# Patient Record
Sex: FEMALE | URBAN - METROPOLITAN AREA
[De-identification: names, ages, dates, MRNs, and addresses within clinical notes are randomized per-mention and may not be internally consistent; named-entity substitution may affect disease eponyms.]

---

## 2022-12-02 ENCOUNTER — OFFICE VISIT (OUTPATIENT)
Dept: URBAN - METROPOLITAN AREA CLINIC 19 | Facility: CLINIC | Age: 77
End: 2022-12-02
Payer: MEDICARE

## 2022-12-02 ENCOUNTER — DASHBOARD ENCOUNTERS (OUTPATIENT)
Age: 77
End: 2022-12-02

## 2022-12-02 VITALS
BODY MASS INDEX: 31.51 KG/M2 | DIASTOLIC BLOOD PRESSURE: 64 MMHG | WEIGHT: 184.6 LBS | HEART RATE: 67 BPM | SYSTOLIC BLOOD PRESSURE: 106 MMHG | HEIGHT: 64 IN

## 2022-12-02 DIAGNOSIS — K92.1 BLOOD IN STOOL: ICD-10-CM

## 2022-12-02 DIAGNOSIS — R15.9 FECAL INCONTINENCE: ICD-10-CM

## 2022-12-02 DIAGNOSIS — R19.8 TENESMUS: ICD-10-CM

## 2022-12-02 DIAGNOSIS — R19.7 DIARRHEA: ICD-10-CM

## 2022-12-02 PROBLEM — 267053000 TENESMUS: Status: ACTIVE | Noted: 2022-12-02

## 2022-12-02 PROBLEM — 301754002 RIGHT LOWER QUADRANT PAIN: Status: ACTIVE | Noted: 2022-12-02

## 2022-12-02 PROBLEM — 128926000 POSTPROCEDURAL STATES: Status: ACTIVE | Noted: 2022-12-02

## 2022-12-02 PROCEDURE — 99244 OFF/OP CNSLTJ NEW/EST MOD 40: CPT | Performed by: INTERNAL MEDICINE

## 2022-12-02 PROCEDURE — 99204 OFFICE O/P NEW MOD 45 MIN: CPT | Performed by: INTERNAL MEDICINE

## 2022-12-02 RX ORDER — ATENOLOL 25 MG/1
1 TABLET TABLET ORAL ONCE A DAY
Status: ACTIVE | COMMUNITY

## 2022-12-02 NOTE — HPI-TODAY'S VISIT:
Patient presents as a referral from Dr. Loni Valdes for evaluation of a variety of gastrointestinal complaints. A copy of this note will be sent to the referring provider.   The patient had been getting regular colonoscopies by Dr. Anam Lange - will get previous reports for review. She states that she is concerned that her father, a WW[employer] , may have had colon cancer, but he apparently would never see the doctor. About 3 weeks ago, she had some right-sided abdominal pain associated with liquid yellow stool and a small amount of blood. She has issues with tenesmus and fecal incontinence - she had a hemorrhoidectomy in 1974. We discussed the possibility of IBS - she does eat Greek yogurt/smoothies every day but has never tried an antispasmotic.

## 2022-12-05 PROBLEM — 62315008 DIARRHEA: Status: ACTIVE | Noted: 2022-12-02

## 2022-12-05 LAB
A/G RATIO: 1.7
ALBUMIN: 4.3
ALKALINE PHOSPHATASE: 65
ALT (SGPT): 11
AST (SGOT): 16
BILIRUBIN, TOTAL: 0.6
BUN/CREATININE RATIO: (no result)
BUN: 13
C-REACTIVE PROTEIN, QUANT: 11.6
CALCIUM: 8.9
CARBON DIOXIDE, TOTAL: 29
CHLORIDE: 105
CREATININE: 0.91
EGFR: 66
GLOBULIN, TOTAL: 2.6
GLUCOSE: 82
HEMATOCRIT: 39.9
HEMOGLOBIN: 13.8
MCH: 31.2
MCHC: 34.6
MCV: 90.3
MPV: 12.7
PLATELET COUNT: 182
POTASSIUM: 4.6
PROTEIN, TOTAL: 6.9
RDW: 12.4
RED BLOOD CELL COUNT: 4.42
SED RATE BY MODIFIED: 22
SODIUM: 141
WHITE BLOOD CELL COUNT: 4.4

## 2022-12-06 ENCOUNTER — TELEPHONE ENCOUNTER (OUTPATIENT)
Dept: URBAN - METROPOLITAN AREA CLINIC 96 | Facility: CLINIC | Age: 77
End: 2022-12-06

## 2022-12-13 ENCOUNTER — CLAIMS CREATED FROM THE CLAIM WINDOW (OUTPATIENT)
Dept: URBAN - METROPOLITAN AREA SURGERY CENTER 31 | Facility: SURGERY CENTER | Age: 77
End: 2022-12-13

## 2022-12-13 ENCOUNTER — CLAIMS CREATED FROM THE CLAIM WINDOW (OUTPATIENT)
Dept: URBAN - METROPOLITAN AREA SURGERY CENTER 31 | Facility: SURGERY CENTER | Age: 77
End: 2022-12-13
Payer: MEDICARE

## 2022-12-13 ENCOUNTER — CLAIMS CREATED FROM THE CLAIM WINDOW (OUTPATIENT)
Dept: URBAN - METROPOLITAN AREA CLINIC 4 | Facility: CLINIC | Age: 77
End: 2022-12-13
Payer: MEDICARE

## 2022-12-13 DIAGNOSIS — R15.9 FECAL INCONTINENCE: ICD-10-CM

## 2022-12-13 DIAGNOSIS — K63.89 OTHER SPECIFIED DISEASES OF INTESTINE: ICD-10-CM

## 2022-12-13 DIAGNOSIS — K92.1 BLOOD IN STOOL: ICD-10-CM

## 2022-12-13 DIAGNOSIS — R19.7 ACUTE DIARRHEA: ICD-10-CM

## 2022-12-13 PROCEDURE — 45378 DIAGNOSTIC COLONOSCOPY: CPT | Performed by: INTERNAL MEDICINE

## 2022-12-13 PROCEDURE — 88305 TISSUE EXAM BY PATHOLOGIST: CPT | Performed by: PATHOLOGY

## 2022-12-13 PROCEDURE — G8907 PT DOC NO EVENTS ON DISCHARG: HCPCS | Performed by: INTERNAL MEDICINE

## 2022-12-13 RX ORDER — ATENOLOL 25 MG/1
1 TABLET TABLET ORAL ONCE A DAY
Status: ACTIVE | COMMUNITY

## 2023-01-23 PROBLEM — 405729008 BLOOD IN STOOL: Status: ACTIVE | Noted: 2022-12-02

## 2023-01-23 PROBLEM — 72042002 BOWEL INCONTINENCE: Status: ACTIVE | Noted: 2022-12-02

## 2023-03-17 ENCOUNTER — TELEPHONE ENCOUNTER (OUTPATIENT)
Dept: URBAN - METROPOLITAN AREA CLINIC 19 | Facility: CLINIC | Age: 78
End: 2023-03-17